# Patient Record
Sex: FEMALE | Race: WHITE | Employment: FULL TIME | URBAN - METROPOLITAN AREA
[De-identification: names, ages, dates, MRNs, and addresses within clinical notes are randomized per-mention and may not be internally consistent; named-entity substitution may affect disease eponyms.]

---

## 2017-08-24 ENCOUNTER — APPOINTMENT (OUTPATIENT)
Dept: PHYSICAL THERAPY | Age: 47
End: 2017-08-24

## 2017-08-31 ENCOUNTER — HOSPITAL ENCOUNTER (OUTPATIENT)
Dept: PHYSICAL THERAPY | Age: 47
End: 2017-08-31

## 2018-02-26 ENCOUNTER — HOSPITAL ENCOUNTER (OUTPATIENT)
Dept: ULTRASOUND IMAGING | Age: 48
Discharge: HOME | End: 2018-02-26
Payer: COMMERCIAL

## 2018-02-26 DIAGNOSIS — R14.0 ABDOMINAL DISTENTION: ICD-10-CM

## 2018-02-26 PROCEDURE — 76830 TRANSVAGINAL US NON-OB: CPT

## 2018-02-26 PROCEDURE — 76700 US EXAM ABDOM COMPLETE: CPT

## 2018-02-26 PROCEDURE — 76856 US EXAM PELVIC COMPLETE: CPT

## 2018-04-02 ENCOUNTER — TELEPHONE (OUTPATIENT)
Dept: SLEEP MEDICINE | Age: 48
End: 2018-04-02

## 2018-04-02 NOTE — TELEPHONE ENCOUNTER
Patient called stating that she has had an increase in abdominal bloating for the past few months. She would like a decrease in her PAP pressure setting.  She can be contacted at [phone]

## 2018-04-03 NOTE — TELEPHONE ENCOUNTER
Patient called today with a pressure change request on her Resmed  Airsense CPAP device. She has been having problems with feeling bloated in the morning after using her CPAP at night and wanted the pressure reduced from 11cm to 9cm to see if this corrects the problem she has been experiencing the past month or so. A download was completed and mask leak is minimal. Download will be scanned into her chart. Pressure was adjusted remotely in the lab from 11cm to 9cm and a two week download will be done to check her progress. She will also call the lab is the problem persists.

## 2018-04-27 ENCOUNTER — TELEPHONE (OUTPATIENT)
Dept: SLEEP MEDICINE | Age: 48
End: 2018-04-27

## 2018-04-27 NOTE — TELEPHONE ENCOUNTER
Patient called stating that she thinks that her pressure settings need to be adjusted. Please advise.

## 2018-04-30 NOTE — TELEPHONE ENCOUNTER
Spoke with patient who was concerned her apnea had increased with pressure change from 4/3/18. Patient was assured AHI is still within normal range. However, we can make further adjustments in pressure if this would be more comfortable for her. Patient agrees to keep pressure set at 9 cm and will contact us if she feels a change is needed.

## 2018-10-18 ENCOUNTER — OFFICE VISIT (OUTPATIENT)
Dept: SLEEP MEDICINE | Age: 48
End: 2018-10-18

## 2018-10-18 VITALS
WEIGHT: 209.9 LBS | DIASTOLIC BLOOD PRESSURE: 73 MMHG | BODY MASS INDEX: 34.97 KG/M2 | SYSTOLIC BLOOD PRESSURE: 116 MMHG | HEIGHT: 65 IN | HEART RATE: 79 BPM | OXYGEN SATURATION: 95 %

## 2018-10-18 DIAGNOSIS — I10 ESSENTIAL HYPERTENSION: ICD-10-CM

## 2018-10-18 DIAGNOSIS — Z78.9 INTOLERANCE OF CONTINUOUS POSITIVE AIRWAY PRESSURE (CPAP) VENTILATION: ICD-10-CM

## 2018-10-18 DIAGNOSIS — Z99.89 OSA ON CPAP: Primary | ICD-10-CM

## 2018-10-18 DIAGNOSIS — G47.33 OSA ON CPAP: Primary | ICD-10-CM

## 2018-10-18 RX ORDER — PANTOPRAZOLE SODIUM 40 MG/1
TABLET, DELAYED RELEASE ORAL
Refills: 5 | COMMUNITY
Start: 2018-10-07

## 2018-10-18 NOTE — PROGRESS NOTES
[address] [place]. [address], [address] Tel.  [phone] Fax. 100 [facility] 60 [place], [address] Tel.  [phone] Fax. [phone] [address] [facility] [place] [address] Tel.  [phone] Fax. [phone] S>Helene Damico is a 46 y.o. female seen for a positive airway pressure follow-up. She reports no problems using the device. She is 99% compliant over the past 90 days. The following problems are identified: 
 
Drowsiness no Problems exhaling no Snoring no Forget to put on no Mask Comfortable yes Can't fall asleep no  
Dry Mouth no Mask falls off no Air Leaking no Frequent awakenings no She admits that her sleep has improved. Allergies Allergen Reactions  Erythromycin Rash She has a current medication list which includes the following prescription(s): pantoprazole, lisinopril, simvastatin, ergocalciferol (vitamin d2), nicotinic acid, and ranitidine. Dominick Kirk She  has a past medical history of Hypertension. [place] Sleepiness Score: 11 
 and Modified F.O.S.Q. Score Total / 2: 15.5 
 which reflect improved sleep quality over therapy time. O>   
Visit Vitals /73 Pulse 79 Ht 5' 5\" (1.651 m) Wt 209 lb 14.4 oz (95.2 kg) SpO2 95% BMI 34.93 kg/m² General:   Not in acute distress Eyes:  Anicteric sclerae, no obvious strabismus Nose:  No obvious nasal septum deviation Oropharynx:   Class 4 oropharyngeal outlet, thick tongue base, uvula not seen due to low-lying soft palate, narrow tonsilo-pharyngeal pilars Tonsils:   tonsils are not visualized due to low-lying soft palate Neck:   midline trachea Chest/Lungs:  Equal lung expansion, clear on auscultation CVS:  Normal rate, regular rhythm; no JVD Skin:  Warm to touch; no obvious rashes Neuro:  No focal deficits ; no obvious tremor Psych:  Normal affect,  normal countenance;    
 
 
 
A> 
  ICD-10-CM ICD-9-CM 1.  DIANA on CPAP G47.33 327.23 SLEEP LAB (PAP TITRATION) Z99.89 V46.8 2. Intolerance of continuous positive airway pressure (CPAP) ventilation Z78.9 V49.89 SLEEP LAB (PAP TITRATION) 3. Essential hypertension I10 401.9 SLEEP LAB (PAP TITRATION) 4. BMI 34.0-34.9,adult Z68.34 V85.34 SLEEP LAB (PAP TITRATION) AHI = 55 (2016). On CPAP :  9 cmH2O. Compliant:      yes Therapeutic Response:  Negative P> * We have recommended a dedicated weight loss through appropriate diet and an exercise regiment as significant weight reduction has been shown to reduce severity of obstructive sleep apnea. * Follow-up Disposition: 
Return if symptoms worsen or fail to improve. * She was asked to contact our office for any problems regarding PAP therapy. * Counseling was provided regarding the importance of regular PAP use and on proper sleep hygiene and safe driving. * Re-enforced proper and regular cleaning for the device. Thank you for allowing us to participate in your patient's medical care. Mor Fraire MD, [place] Electronically signed. 10/18/18

## 2018-10-18 NOTE — PATIENT INSTRUCTIONS
201 [facility] [place]. 1668 [facility], [address] Tel.  [phone] Fax. 100 [facility] 60 [place], [address] [facility] Tel.  [phone] Fax. [phone] 9250 [place] Tel.  [phone] Fax. [phone] Learning About CPAP for Sleep Apnea What is CPAP? CPAP is a small machine that you use at home every night while you sleep. It increases air pressure in your throat to keep your airway open. When you have sleep apnea, this can help you sleep better so you feel much better. CPAP stands for \"continuous positive airway pressure. \" The CPAP machine will have one of the following: · A mask that covers your nose and mouth · Prongs that fit into your nose · A mask that covers your nose only, the most common type. This type is called NCPAP. The N stands for \"nasal.\" Why is it done? CPAP is usually the best treatment for obstructive sleep apnea. It is the first treatment choice and the most widely used. Your doctor may suggest CPAP if you have: · Moderate to severe sleep apnea. · Sleep apnea and coronary artery disease (CAD) or heart failure. How does it help? · CPAP can help you have more normal sleep, so you feel less sleepy and more alert during the daytime. · CPAP may help keep heart failure or other heart problems from getting worse. · NCPAP may help lower your blood pressure. · If you use CPAP, your bed partner may also sleep better because you are not snoring or restless. What are the side effects? Some people who use CPAP have: · A dry or stuffy nose and a sore throat. · Irritated skin on the face. · Sore eyes. · Bloating. If you have any of these problems, work with your doctor to fix them. Here are some things you can try: · Be sure the mask or nasal prongs fit well. · See if your doctor can adjust the pressure of your CPAP. · If your nose is dry, try a humidifier.  
· If your nose is runny or stuffy, try decongestant medicine or a steroid nasal spray. If these things do not help, you might try a different type of machine. Some machines have air pressure that adjusts on its own. Others have air pressures that are different when you breathe in than when you breathe out. This may reduce discomfort caused by too much pressure in your nose. Where can you learn more? Go to [employer] Enter K417 in the search box to learn more about \"Learning About CPAP for Sleep Apnea. \"  
© 7858-5993 Healthwise, [employer]. Care instructions adapted under license by Clara Monge (which disclaims liability or warranty for this information). This care instruction is for use with your licensed healthcare professional. If you have questions about a medical condition or this instruction, always ask your healthcare professional. [address] any warranty or liability for your use of this information. Content Version: 3.0.53518; Last Revised: January 11, 2010 PROPER SLEEP HYGIENE What to avoid · Do not have drinks with caffeine, such as coffee or black tea, for 8 hours before bed. · Do not smoke or use other types of tobacco near bedtime. Nicotine is a stimulant and can keep you awake. · Avoid drinking alcohol late in the evening, because it can cause you to wake in the middle of the night. · Do not eat a big meal close to bedtime. If you are hungry, eat a light snack. · Do not drink a lot of water close to bedtime, because the need to urinate may wake you up during the night. · Do not read or watch TV in bed. Use the bed only for sleeping and sexual activity. What to try · Go to bed at the same time every night, and wake up at the same time every morning. Do not take naps during the day. · Keep your bedroom quiet, dark, and cool. · Get regular exercise, but not within 3 to 4 hours of your bedtime. Dominick Kirk · Sleep on a comfortable pillow and mattress.  
· If watching the clock makes you anxious, turn it facing away from you so you cannot see the time. · If you worry when you lie down, start a worry book. Well before bedtime, write down your worries, and then set the book and your concerns aside. · Try meditation or other relaxation techniques before you go to bed. · If you cannot fall asleep, get up and go to another room until you feel sleepy. Do something relaxing. Repeat your bedtime routine before you go to bed again. · Make your house quiet and calm about an hour before bedtime. Turn down the lights, turn off the TV, log off the computer, and turn down the volume on music. This can help you relax after a busy day. Drowsy Driving: The [employer] cites drowsiness as a causing factor in more than 083,711 police reported crashes annually, resulting in 76,000 injuries and 1,500 deaths. Other surveys suggest 55% of people polled have driven while drowsy in the past year, 23% had fallen asleep but not crashed, 3% crashed, and 2% had and accident due to drowsy driving. Who is at risk? Young Drivers: One study of drowsy driving accidents states that 55% of the drivers were under 25 years. Of those, 75% were male. Shift Workers and Travelers: People who work overnight or travel across time zones frequently are at higher risk of experiencing Circadian Rhythm Disorders. They are trying to work and function when their body is programed to sleep. Sleep Deprived: Lack of sleep has a serious impact on your ability to pay attention or focus on a task. Consistently getting less than the average of 8 hours your body needs creates partial or cumulative sleep deprivation. Untreated Sleep Disorders: Sleep Apnea, Narcolepsy, R.L.S., and other sleep disorders (untreated) prevent a person from getting enough restful sleep. This leads to excessive daytime sleepiness and increases the risk for drowsy driving accidents by up to 7 times.  
Medications / Alcohol: Even over the counter medications can cause drowsiness. Medications that impair a drivers attention should have a warning label. Alcohol naturally makes you sleepy and on its own can cause accidents. Combined with excessive drowsiness its effects are amplified. Signs of Drowsy Driving: * You don't remember driving the last few miles * You may drift out of your toño * You are unable to focus and your thoughts wander * You may yawn more often than normal 
 * You have difficulty keeping your eyes open / nodding off * Missing traffic signs, speeding, or tailgating Prevention-  
Good sleep hygiene, lifestyle and behavioral choices have the most impact on drowsy driving. There is no substitute for sleep and the average person requires 8 hours nightly. If you find yourself driving drowsy, stop and sleep. Consider the sleep hygiene tips provided during your visit as well. Medication Refill Policy: Refills for all medications require 1 week advance notice. Please have your pharmacy fax a refill request. We are unable to fax, or call in \"controled substance\" medications and you will need to pick these prescriptions up from our office. Ubers[employer] Activation Thank you for requesting access to Ubers[employer]. Please follow the instructions below to securely access and download your online medical record. Ubersense allows you to send messages to your doctor, view your test results, renew your prescriptions, schedule appointments, and more. How Do I Sign Up? 1. In your internet browser, go to [email or web address]. 2. Click on the First Time User? Click Here link in the Sign In box. You will see the New Member Sign Up page. 3. Enter your U[employer] Access Code exactly as it appears below. You will not need to use this code after youve completed the sign-up process. If you do not sign up before the expiration date, you must request a new code.  
 
Ubersense Access Code: [email or web address] Expires: 2019 10:52 AM (This is the date your [employer] access code will ) 4. Enter the last four digits of your Social Security Number (xxxx) and Date of Birth (mm/dd/yyyy) as indicated and click Submit. You will be taken to the next sign-up page. 5. Create a [employer] ID. This will be your [employer] login ID and cannot be changed, so think of one that is secure and easy to remember. 6. Create a [employer] password. You can change your password at any time. 7. Enter your Password Reset Question and Answer. This can be used at a later time if you forget your password. 8. Enter your e-mail address. You will receive e-mail notification when new information is available in [address] 9. Click Sign Up. You can now view and download portions of your medical record. 10. Click the Download Summary menu link to download a portable copy of your medical information. Additional Information If you have questions, please call [phone]. Remember, [employer] is NOT to be used for urgent needs. For medical emergencies, dial 911.

## 2018-12-19 ENCOUNTER — HOSPITAL ENCOUNTER (OUTPATIENT)
Dept: SLEEP MEDICINE | Age: 48
Discharge: HOME | End: 2018-12-19
Payer: COMMERCIAL

## 2018-12-19 VITALS
HEIGHT: 65 IN | DIASTOLIC BLOOD PRESSURE: 84 MMHG | SYSTOLIC BLOOD PRESSURE: 149 MMHG | WEIGHT: 215.9 LBS | HEART RATE: 85 BPM | BODY MASS INDEX: 35.97 KG/M2 | OXYGEN SATURATION: 98 %

## 2018-12-19 DIAGNOSIS — G47.33 OSA ON CPAP: ICD-10-CM

## 2018-12-19 DIAGNOSIS — Z78.9 INTOLERANCE OF CONTINUOUS POSITIVE AIRWAY PRESSURE (CPAP) VENTILATION: ICD-10-CM

## 2018-12-19 DIAGNOSIS — I10 ESSENTIAL HYPERTENSION: ICD-10-CM

## 2018-12-19 DIAGNOSIS — Z99.89 OSA ON CPAP: ICD-10-CM

## 2018-12-19 PROCEDURE — 95811 POLYSOM 6/>YRS CPAP 4/> PARM: CPT | Performed by: INTERNAL MEDICINE

## 2018-12-20 ENCOUNTER — TELEPHONE (OUTPATIENT)
Dept: SLEEP MEDICINE | Age: 48
End: 2018-12-20

## 2018-12-20 DIAGNOSIS — G47.33 OSA (OBSTRUCTIVE SLEEP APNEA): Primary | ICD-10-CM

## 2019-01-04 ENCOUNTER — DOCUMENTATION ONLY (OUTPATIENT)
Dept: SLEEP MEDICINE | Age: 49
End: 2019-01-04

## 2019-01-10 ENCOUNTER — DOCUMENTATION ONLY (OUTPATIENT)
Dept: SLEEP MEDICINE | Age: 49
End: 2019-01-10

## 2019-01-10 NOTE — TELEPHONE ENCOUNTER
Orders Placed This Encounter    AMB SUPPLY ORDER     Diagnosis: Sleep Apnea ICD-10 Code (G47.30); ICD-9 Code (780.57). Positive Airway Pressure Therapy: Duration of need: 99 months. ResMed VPAP Auto (VAuto Mode): Maximum IPAP: 15 cmH2O; Minimum EPAP: 09 cmH2O; PS: 4 cmH2O. Ramp Time: 30 Minutes. CPAP mask -  As fitted during titration OR patient preference, headgear, tubing, and filter;  heated humidifier; wireless modem. Remote monitoring enrollment. Forest Dodge MD, FAASM; NPI: [id number]  Electronically signed. 01/10/19

## 2019-01-11 ENCOUNTER — TELEPHONE (OUTPATIENT)
Dept: SLEEP MEDICINE | Age: 49
End: 2019-01-11

## 2019-01-14 ENCOUNTER — DOCUMENTATION ONLY (OUTPATIENT)
Dept: SLEEP MEDICINE | Age: 49
End: 2019-01-14

## 2019-01-14 NOTE — TELEPHONE ENCOUNTER
Patient returned call and requested to change DME providers. She also wanted to confirm that she is getting a BiPAP device. DME [facility]. Sleep records and orders faxed. Also confirmed that order is for BiPAP device. DME process explained to patient. PAP adherence appointment was discussed with patient. Patient's adherence visit is scheduled for 4/2/18 with Dr. Tone Milian.

## 2019-02-08 ENCOUNTER — OFFICE VISIT (OUTPATIENT)
Dept: CARDIOLOGY CLINIC | Age: 49
End: 2019-02-08

## 2019-02-08 VITALS
DIASTOLIC BLOOD PRESSURE: 88 MMHG | WEIGHT: 218.4 LBS | OXYGEN SATURATION: 96 % | SYSTOLIC BLOOD PRESSURE: 130 MMHG | HEART RATE: 74 BPM | BODY MASS INDEX: 36.39 KG/M2 | HEIGHT: 65 IN | RESPIRATION RATE: 17 BRPM

## 2019-02-08 DIAGNOSIS — R06.09 DOE (DYSPNEA ON EXERTION): ICD-10-CM

## 2019-02-08 DIAGNOSIS — R07.9 CHEST PAIN, UNSPECIFIED TYPE: Primary | ICD-10-CM

## 2019-02-08 DIAGNOSIS — E66.01 SEVERE OBESITY (HCC): ICD-10-CM

## 2019-02-08 DIAGNOSIS — R53.83 FATIGUE, UNSPECIFIED TYPE: ICD-10-CM

## 2019-02-08 RX ORDER — LANOLIN ALCOHOL/MO/W.PET/CERES
1000 CREAM (GRAM) TOPICAL DAILY
COMMUNITY

## 2019-02-08 RX ORDER — ASPIRIN 81 MG/1
TABLET ORAL DAILY
COMMUNITY

## 2019-02-08 NOTE — PROGRESS NOTES
[place]: Shaaron Schaumann 
[phone] Requesting/referring provider: Dr. Simón Palmer Reason for Consult: Chest pain and CARREON 
 
HPI: Brian Burkett, a 46y.o. year-old who presents for evaluation of chest pain and shortness of breath starting 2 weeks ago while working out with her . Substernal then went away with rest. Then came back again. Lasted longer. Like a squeeze, thinks it could have been stress but not sure, has a bad family history and that is on her mind. Feels like she is bloated in the belly, has gained a bit of weight No chest pain today. No edema. No no PND or orthopnea. Rare palps, no syncope. Assessment/Plan: 1. Body mass index is 36.34 kg/m². 2. Chest pain- stress test to evalute 3. CARREON worse the faster she goes. 4. HTN controlled on lisinopril 5. Dyslipidemia cont simvastatin 6. DIANA on CPAP nightly, just changed to BIPAP 7. GERD on PPI Fhx dad with Mi at age 61, lots of trouble with it cabg, x4 CEA, stent CHF, mother with high cholesterol, brother with Mi at age 61 gm with pacer She  has a past medical history of Hypertension. Cardiovascular ROS: positive for - chest pain and dyspnea on exertion Respiratory ROS: positive for - shortness of breath Neurological ROS: no TIA or stroke symptoms All other systems negative except as above. PE 
Vitals:  
 02/08/19 9988 BP: 130/88 Pulse: 74 Resp: 17 SpO2: 96% Weight: 218 lb 6.4 oz (99.1 kg) Height: 5' 5\" (1.651 m) Body mass index is 36.34 kg/m². General appearance - alert, well appearing, and in no distress Mental status - affect appropriate to mood Eyes - sclera anicteric, moist mucous membranes Neck - supple, no significant adenopathy Lymphatics - no  lymphadenopathy Chest - clear to auscultation, no wheezes, rales or rhonchi Heart - normal rate, regular rhythm, normal S1, S2, no murmurs, rubs, clicks or gallops Abdomen - soft, nontender, nondistended, no masses or organomegaly Back exam - full range of motion, no tenderness Neurological - cranial nerves II through XII grossly intact, no focal deficit Musculoskeletal - no muscular tenderness noted, normal strength Extremities - peripheral pulses normal, no pedal edema Skin - normal coloration  no rashes Recent Labs: 
No results found for: CHOL, CHOLX, CHLST, CHOLV, 124502, HDL, LDL, LDLC, DLDLP, TGLX, TRIGL, TRIGP, CHHD, CHHDX No results found for: [place], 300 [facility], 530 [facility], [address] [address], 615 [facility], 100 [facility]3] No results found for: BUN, BUNPOC, 49 [facility], 202 [facility], BUNV No results found for: K, KI, PLK, WBK No results found for: HBA1C, HGBE8, QYV1PBMP No results found for: HGBPOC, HGB, HGBP, HGBEXT No results found for: PLT, PLTEXT Reviewed: 
Past Medical History:  
Diagnosis Date  Hypertension Social History Tobacco Use Smoking Status Never Smoker Smokeless Tobacco Never Used Social History Substance and Sexual Activity Alcohol Use No  
 Alcohol/week: 0.0 oz Allergies Allergen Reactions  Erythromycin Rash Current Outpatient Medications Medication Sig  cyanocobalamin (VITAMIN B-12) 1,000 mcg tablet Take 1,000 mcg by mouth daily.  aspirin delayed-release 81 mg tablet Take  by mouth daily.  pantoprazole (PROTONIX) 40 mg tablet TAKE 1 TABLET BY MOUTH EVERY DAY  lisinopril (PRINIVIL, ZESTRIL) 5 mg tablet Take  by mouth daily.  simvastatin (ZOCOR) 40 mg tablet Take  by mouth nightly.  ERGOCALCIFEROL, VITAMIN D2, by Does Not Apply route.  nicotinic acid (NIACIN) 500 mg tablet Take 500 mg by mouth Daily (before breakfast).  ranitidine (ZANTAC) 150 mg tablet Take 150 mg by mouth two (2) times a day. No current facility-administered medications for this visit. Alex Sharif MD 
[facility] heart and Vascular [address], [address]

## 2019-02-18 ENCOUNTER — HOSPITAL ENCOUNTER (OUTPATIENT)
Dept: CT IMAGING | Age: 49
Discharge: HOME | End: 2019-02-18
Payer: SELF-PAY

## 2019-02-18 DIAGNOSIS — Z00.00 PREVENTATIVE HEALTH CARE: ICD-10-CM

## 2019-02-18 PROCEDURE — 75571 CT HRT W/O DYE W/CA TEST: CPT

## 2019-02-22 ENCOUNTER — TELEPHONE (OUTPATIENT)
Dept: CARDIOLOGY CLINIC | Age: 49
End: 2019-02-22

## 2019-02-22 NOTE — TELEPHONE ENCOUNTER
Returned patient's call, 2 pt identifiers used  The following message given per Dr. Marv Jo:    MD Jair Loyd, RN   Cc: Fede Allan RN             Low risk, indeterminate with ca core of 1.  Cont healthy lifestyle dara

## 2019-03-08 ENCOUNTER — TELEPHONE (OUTPATIENT)
Dept: CARDIOLOGY CLINIC | Age: 49
End: 2019-03-08

## 2019-03-08 NOTE — TELEPHONE ENCOUNTER
Pt called stating she has received her stress test results and she is not sure what it means. She is also asking if she should continue the aspirin and if she needs to have a f/u appointment scheduled. She is also wondering if her records have been sent from Dr. Sharla Lujan.   Phone #[phone]  Thanks

## 2019-03-08 NOTE — TELEPHONE ENCOUNTER
Returned patient's call, 2 pt identifiers used    Interpretation Summary     · Baseline ECG: Normal.  · Negative stress test.  · Stress test results correlate with a low risk of inducible myocardial ischemia. · Normal stress echocardiogram. Low risk study. Above stress echo results given. Per Dr. Paul Rodriguez patient does not need to take ASA with a ca score of 1. No f/u appt needed at this time.     Future Appointments   Date Time Provider Vanessa Villai   4/2/2019  9:20 AM Sukumar May MD [facility] [address]

## 2019-04-02 ENCOUNTER — OFFICE VISIT (OUTPATIENT)
Dept: SLEEP MEDICINE | Age: 49
End: 2019-04-02

## 2019-04-02 ENCOUNTER — DOCUMENTATION ONLY (OUTPATIENT)
Dept: SLEEP MEDICINE | Age: 49
End: 2019-04-02

## 2019-04-02 VITALS
BODY MASS INDEX: 36.99 KG/M2 | WEIGHT: 222 LBS | DIASTOLIC BLOOD PRESSURE: 70 MMHG | HEIGHT: 65 IN | HEART RATE: 82 BPM | SYSTOLIC BLOOD PRESSURE: 106 MMHG | OXYGEN SATURATION: 96 %

## 2019-04-02 DIAGNOSIS — G47.33 OSA (OBSTRUCTIVE SLEEP APNEA): Primary | ICD-10-CM

## 2019-04-02 DIAGNOSIS — I10 ESSENTIAL HYPERTENSION: ICD-10-CM

## 2019-04-02 NOTE — Clinical Note
* Device pressure change to Bi - Level  14/10 cmH2O. * Mask evaluation done in the office - see tech notes. * PAP card download in 4 weeks.

## 2019-04-02 NOTE — PROGRESS NOTES
[address] [place]. [place], [address]  Tel.  [phone]  Fax. 100 [facility] 60  [place], [address] [place]  Tel.  [phone]  Fax. [phone] 9[address] [facility] Gertrude Simeon   Tel.  [phone]  Fax. [phone]     S>Helene Reeder is a 46 y.o. female seen for a positive airway pressure follow-up. She reports no problems using the device. She is ?% compliant over the past 98 days. The following problems are identified:    Drowsiness no Problems exhaling no   Snoring no Forget to put on no   Mask Comfortable yes Can't fall asleep no   Dry Mouth no Mask falls off no   Air Leaking no Frequent awakenings no         She admits that her sleep has improved on PAP therapy using nasal pillows mask and heated tubing. She reports of much improved sleep but persistent bloating on PAP Therapy. Allergies   Allergen Reactions    Erythromycin Rash       She has a current medication list which includes the following prescription(s): cyanocobalamin, aspirin delayed-release, pantoprazole, lisinopril, simvastatin, ergocalciferol (vitamin d2), nicotinic acid, and ranitidine. .      She  has a past medical history of Hypertension. [place] Sleepiness Score: 4   and Modified F.O.S.Q. Score Total / 2: 18.5   which reflect improved sleep quality over therapy time.     O>    Visit Vitals  /70   Pulse 82   Ht 5' 5\" (1.651 m)   Wt 222 lb (100.7 kg)   SpO2 96%   BMI 36.94 kg/m²         General:   Not in acute distress   Eyes:  Anicteric sclerae, no obvious strabismus   Nose:  No obvious nasal septum deviation    Oropharynx:   Class 4 oropharyngeal outlet, thick tongue base, uvula not seen due to low-lying soft palate, narrow tonsilo-pharyngeal pilars   Tonsils:   tonsils are not visualized due to low-lying soft palate   Neck:   midline trachea   Chest/Lungs:  Equal lung expansion, clear on auscultation    CVS:  Normal rate, regular rhythm; no JVD   Skin:  Warm to touch; no obvious rashes   Neuro:  No focal deficits ; no obvious tremor    Psych:  Normal affect,  normal countenance;           A>    ICD-10-CM ICD-9-CM    1. DIANA (obstructive sleep apnea) G47.33 327.23 AMB SUPPLY ORDER   2. BMI 36.0-36.9,adult Z68.36 V85.36    3. Essential hypertension I10 401.9      AHI = 55 (2016). On Bi - Level :  See media for download. Compliant:      yes    Therapeutic Response:  Positive    P>    * Device pressure change to Bi - Level  14/10 cmH2O. * Mask evaluation done in the office - see tech notes. * PAP card download in 4 weeks. * We have recommended a dedicated weight loss through appropriate diet and an exercise regiment as significant weight reduction has been shown to reduce severity of obstructive sleep apnea. *   Follow-up and Dispositions    · Return in about 1 year (around 4/2/2020), or if symptoms worsen or fail to improve. * She was asked to contact our office for any problems regarding PAP therapy. * Counseling was provided regarding the importance of regular PAP use and on proper sleep hygiene and safe driving. * Re-enforced proper and regular cleaning for the device. Thank you for allowing us to participate in your patient's medical care. Nance Duverney, MD, FAASM  Electronically signed.  04/02/19

## 2019-04-02 NOTE — PATIENT INSTRUCTIONS
[address] [place]. [place], [address]  Tel.  [phone]  Fax. [address] [facility] 60  [place], [address] [facility]  Tel.  [phone]  Fax. [phone] [address]  Tel.  [phone]  Fax. [phone]     Learning About CPAP for Sleep Apnea  What is CPAP? CPAP is a small machine that you use at home every night while you sleep. It increases air pressure in your throat to keep your airway open. When you have sleep apnea, this can help you sleep better so you feel much better. CPAP stands for \"continuous positive airway pressure. \"  The CPAP machine will have one of the following:  · A mask that covers your nose and mouth  · Prongs that fit into your nose  · A mask that covers your nose only, the most common type. This type is called NCPAP. The N stands for \"nasal.\"  Why is it done? CPAP is usually the best treatment for obstructive sleep apnea. It is the first treatment choice and the most widely used. Your doctor may suggest CPAP if you have:  · Moderate to severe sleep apnea. · Sleep apnea and coronary artery disease (CAD) or heart failure. How does it help? · CPAP can help you have more normal sleep, so you feel less sleepy and more alert during the daytime. · CPAP may help keep heart failure or other heart problems from getting worse. · NCPAP may help lower your blood pressure. · If you use CPAP, your bed partner may also sleep better because you are not snoring or restless. What are the side effects? Some people who use CPAP have:  · A dry or stuffy nose and a sore throat. · Irritated skin on the face. · Sore eyes. · Bloating. If you have any of these problems, work with your doctor to fix them. Here are some things you can try:  · Be sure the mask or nasal prongs fit well. · See if your doctor can adjust the pressure of your CPAP. · If your nose is dry, try a humidifier.   · If your nose is runny or stuffy, try decongestant medicine or a steroid nasal spray. If these things do not help, you might try a different type of machine. Some machines have air pressure that adjusts on its own. Others have air pressures that are different when you breathe in than when you breathe out. This may reduce discomfort caused by too much pressure in your nose. Where can you learn more? Go to [employer]  Enter [employer] in the search box to learn more about \"Learning About CPAP for Sleep Apnea. \"   © 2770-7089 Healthwise, Incorporated. Care instructions adapted under license by [facility] [employer] (which disclaims liability or warranty for this information). This care instruction is for use with your licensed healthcare professional. If you have questions about a medical condition or this instruction, always ask your healthcare professional. [address] any warranty or liability for your use of this information. Content Version: 2.7.36317; Last Revised: January 11, 2010  PROPER SLEEP HYGIENE    What to avoid  · Do not have drinks with caffeine, such as coffee or black tea, for 8 hours before bed. · Do not smoke or use other types of tobacco near bedtime. Nicotine is a stimulant and can keep you awake. · Avoid drinking alcohol late in the evening, because it can cause you to wake in the middle of the night. · Do not eat a big meal close to bedtime. If you are hungry, eat a light snack. · Do not drink a lot of water close to bedtime, because the need to urinate may wake you up during the night. · Do not read or watch TV in bed. Use the bed only for sleeping and sexual activity. What to try  · Go to bed at the same time every night, and wake up at the same time every morning. Do not take naps during the day. · Keep your bedroom quiet, dark, and cool. · Get regular exercise, but not within 3 to 4 hours of your bedtime. .  · Sleep on a comfortable pillow and mattress.   · If watching the clock makes you anxious, turn it facing away from you so you cannot see the time. · If you worry when you lie down, start a worry book. Well before bedtime, write down your worries, and then set the book and your concerns aside. · Try meditation or other relaxation techniques before you go to bed. · If you cannot fall asleep, get up and go to another room until you feel sleepy. Do something relaxing. Repeat your bedtime routine before you go to bed again. · Make your house quiet and calm about an hour before bedtime. Turn down the lights, turn off the TV, log off the computer, and turn down the volume on music. This can help you relax after a busy day. Drowsy Driving: The [facility] cites drowsiness as a causing factor in more than 249,086 police reported crashes annually, resulting in 76,000 injuries and 1,500 deaths. Other surveys suggest 55% of people polled have driven while drowsy in the past year, 23% had fallen asleep but not crashed, 3% crashed, and 2% had and accident due to drowsy driving. Who is at risk? Young Drivers: One study of drowsy driving accidents states that 55% of the drivers were under 25 years. Of those, 75% were male. Shift Workers and Travelers: People who work overnight or travel across time zones frequently are at higher risk of experiencing Circadian Rhythm Disorders. They are trying to work and function when their body is programed to sleep. Sleep Deprived: Lack of sleep has a serious impact on your ability to pay attention or focus on a task. Consistently getting less than the average of 8 hours your body needs creates partial or cumulative sleep deprivation. Untreated Sleep Disorders: Sleep Apnea, Narcolepsy, R.L.S., and other sleep disorders (untreated) prevent a person from getting enough restful sleep. This leads to excessive daytime sleepiness and increases the risk for drowsy driving accidents by up to 7 times.   Medications / Alcohol: Even over the counter medications can cause drowsiness. Medications that impair a drivers attention should have a warning label. Alcohol naturally makes you sleepy and on its own can cause accidents. Combined with excessive drowsiness its effects are amplified. Signs of Drowsy Driving:   * You don't remember driving the last few miles   * You may drift out of your toño   * You are unable to focus and your thoughts wander   * You may yawn more often than normal   * You have difficulty keeping your eyes open / nodding off   * Missing traffic signs, speeding, or tailgating  Prevention-   Good sleep hygiene, lifestyle and behavioral choices have the most impact on drowsy driving. There is no substitute for sleep and the average person requires 8 hours nightly. If you find yourself driving drowsy, stop and sleep. Consider the sleep hygiene tips provided during your visit as well. Medication Refill Policy: Refills for all medications require 1 week advance notice. Please have your pharmacy fax a refill request. We are unable to fax, or call in \"controled substance\" medications and you will need to pick these prescriptions up from our office. [employer]ter Activation    Thank you for requesting access to [employer]ter. Please follow the instructions below to securely access and download your online medical record. [employer] Plotter allows you to send messages to your doctor, view your test results, renew your prescriptions, schedule appointments, and more. How Do I Sign Up? 1. In your internet browser, go to [email or web address]. 2. Click on the First Time User? Click Here link in the Sign In box. You will see the New Member Sign Up page. 3. Enter your [employer]ter Access Code exactly as it appears below. You will not need to use this code after youve completed the sign-up process. If you do not sign up before the expiration date, you must request a new code. [employer]ter Access Code:  Activation code not generated  Current frooly Status: Active (This is the date your frooly access code will )    4. Enter the last four digits of your Social Security Number (xxxx) and Date of Birth (mm/dd/yyyy) as indicated and click Submit. You will be taken to the next sign-up page. 5. Create a [email or web address] ID. This will be your frooly login ID and cannot be changed, so think of one that is secure and easy to remember. 6. Create a frooly password. You can change your password at any time. 7. Enter your Password Reset Question and Answer. This can be used at a later time if you forget your password. 8. Enter your e-mail address. You will receive e-mail notification when new information is available in [address] 9. Click Sign Up. You can now view and download portions of your medical record. 10. Click the Download Summary menu link to download a portable copy of your medical information. Additional Information    If you have questions, please call [phone]. Remember, frooly is NOT to be used for urgent needs. For medical emergencies, dial 911.

## 2019-06-19 ENCOUNTER — ANESTHESIA EVENT (OUTPATIENT)
Dept: ENDOSCOPY | Age: 49
End: 2019-06-19
Payer: COMMERCIAL

## 2019-06-19 NOTE — ANESTHESIA PREPROCEDURE EVALUATION
Relevant Problems   No relevant active problems       Anesthetic History   No history of anesthetic complications            Review of Systems / Medical History  Patient summary reviewed, nursing notes reviewed and pertinent labs reviewed    Pulmonary        Sleep apnea: BiPAP           Neuro/Psych   Within defined limits           Cardiovascular    Hypertension          Hyperlipidemia         GI/Hepatic/Renal     GERD           Endo/Other        Morbid obesity and arthritis     Other Findings            Physical Exam    Airway  Mallampati: III  TM Distance: > 6 cm  Neck ROM: normal range of motion   Mouth opening: Normal     Cardiovascular  Regular rate and rhythm,  S1 and S2 normal,  no murmur, click, rub, or gallop             Dental  No notable dental hx       Pulmonary  Breath sounds clear to auscultation               Abdominal  GI exam deferred       Other Findings            Anesthetic Plan    ASA: 3  Anesthesia type: MAC            Anesthetic plan and risks discussed with: Patient

## 2019-06-20 ENCOUNTER — ANESTHESIA (OUTPATIENT)
Dept: ENDOSCOPY | Age: 49
End: 2019-06-20
Payer: COMMERCIAL

## 2019-06-20 ENCOUNTER — HOSPITAL ENCOUNTER (OUTPATIENT)
Age: 49
Setting detail: OUTPATIENT SURGERY
Discharge: HOME | End: 2019-06-20
Attending: SPECIALIST | Admitting: SPECIALIST
Payer: COMMERCIAL

## 2019-06-20 VITALS
TEMPERATURE: 97.6 F | HEART RATE: 75 BPM | OXYGEN SATURATION: 100 % | DIASTOLIC BLOOD PRESSURE: 79 MMHG | SYSTOLIC BLOOD PRESSURE: 125 MMHG | RESPIRATION RATE: 10 BRPM

## 2019-06-20 PROCEDURE — 74011250636 HC RX REV CODE- 250/636

## 2019-06-20 PROCEDURE — 76060000032 HC ANESTHESIA 0.5 TO 1 HR: Performed by: SPECIALIST

## 2019-06-20 PROCEDURE — 77030021593 HC FCPS BIOP ENDOSC BSC -A: Performed by: SPECIALIST

## 2019-06-20 PROCEDURE — 76040000007: Performed by: SPECIALIST

## 2019-06-20 PROCEDURE — 77030027957 HC TBNG IRR ENDOGTR BUSS -B: Performed by: SPECIALIST

## 2019-06-20 PROCEDURE — 88305 TISSUE EXAM BY PATHOLOGIST: CPT

## 2019-06-20 RX ORDER — DEXTROMETHORPHAN/PSEUDOEPHED 2.5-7.5/.8
1.2 DROPS ORAL
Status: DISCONTINUED | OUTPATIENT
Start: 2019-06-20 | End: 2019-06-20 | Stop reason: HOSPADM

## 2019-06-20 RX ORDER — SODIUM CHLORIDE 9 MG/ML
INJECTION, SOLUTION INTRAVENOUS
Status: DISCONTINUED | OUTPATIENT
Start: 2019-06-20 | End: 2019-06-20 | Stop reason: HOSPADM

## 2019-06-20 RX ORDER — SODIUM CHLORIDE 9 MG/ML
50 INJECTION, SOLUTION INTRAVENOUS CONTINUOUS
Status: DISCONTINUED | OUTPATIENT
Start: 2019-06-20 | End: 2019-06-20 | Stop reason: HOSPADM

## 2019-06-20 RX ORDER — ATROPINE SULFATE 0.1 MG/ML
0.5 INJECTION INTRAVENOUS
Status: DISCONTINUED | OUTPATIENT
Start: 2019-06-20 | End: 2019-06-20 | Stop reason: HOSPADM

## 2019-06-20 RX ORDER — SODIUM CHLORIDE 0.9 % (FLUSH) 0.9 %
5-40 SYRINGE (ML) INJECTION AS NEEDED
Status: DISCONTINUED | OUTPATIENT
Start: 2019-06-20 | End: 2019-06-20 | Stop reason: HOSPADM

## 2019-06-20 RX ORDER — PROPOFOL 10 MG/ML
INJECTION, EMULSION INTRAVENOUS AS NEEDED
Status: DISCONTINUED | OUTPATIENT
Start: 2019-06-20 | End: 2019-06-20 | Stop reason: HOSPADM

## 2019-06-20 RX ORDER — SODIUM CHLORIDE 0.9 % (FLUSH) 0.9 %
5-40 SYRINGE (ML) INJECTION EVERY 8 HOURS
Status: DISCONTINUED | OUTPATIENT
Start: 2019-06-20 | End: 2019-06-20 | Stop reason: HOSPADM

## 2019-06-20 RX ORDER — FLUMAZENIL 0.1 MG/ML
0.2 INJECTION INTRAVENOUS
Status: DISCONTINUED | OUTPATIENT
Start: 2019-06-20 | End: 2019-06-20 | Stop reason: HOSPADM

## 2019-06-20 RX ORDER — NALOXONE HYDROCHLORIDE 0.4 MG/ML
0.4 INJECTION, SOLUTION INTRAMUSCULAR; INTRAVENOUS; SUBCUTANEOUS
Status: DISCONTINUED | OUTPATIENT
Start: 2019-06-20 | End: 2019-06-20 | Stop reason: HOSPADM

## 2019-06-20 RX ORDER — EPINEPHRINE 0.1 MG/ML
1 INJECTION INTRACARDIAC; INTRAVENOUS
Status: DISCONTINUED | OUTPATIENT
Start: 2019-06-20 | End: 2019-06-20 | Stop reason: HOSPADM

## 2019-06-20 RX ADMIN — PROPOFOL 100 MG: 10 INJECTION, EMULSION INTRAVENOUS at 13:01

## 2019-06-20 RX ADMIN — PROPOFOL 50 MG: 10 INJECTION, EMULSION INTRAVENOUS at 13:11

## 2019-06-20 RX ADMIN — PROPOFOL 50 MG: 10 INJECTION, EMULSION INTRAVENOUS at 12:59

## 2019-06-20 RX ADMIN — PROPOFOL 50 MG: 10 INJECTION, EMULSION INTRAVENOUS at 13:17

## 2019-06-20 RX ADMIN — PROPOFOL 100 MG: 10 INJECTION, EMULSION INTRAVENOUS at 12:57

## 2019-06-20 RX ADMIN — SODIUM CHLORIDE: 9 INJECTION, SOLUTION INTRAVENOUS at 12:56

## 2019-06-20 RX ADMIN — PROPOFOL 50 MG: 10 INJECTION, EMULSION INTRAVENOUS at 13:06

## 2019-06-20 NOTE — DISCHARGE INSTRUCTIONS
[address] [facility].  [address] [address] [facility]   [id number]  1970    DISCOMFORT:  Redness at IV site- apply warm compress to area; if redness or soreness persist- contact your physician  There may be a slight amount of blood passed from the rectum  Gaseous discomfort- walking, belching will help relieve any discomfort  You may not operate a vehicle for 12 hours  You may not  engage in an occupation involving machinery or appliances for rest of today  You may not  drink alcoholic beverages for at least 12 hours  Avoid making any critical decisions for at least 24 hour    DIET:   High fiber diet. - however -  remember your colon is empty and a heavy meal will produce gas. Avoid these foods:  vegetables, fried / greasy foods, carbonated drinks for today      ACTIVITY  You may resume your normal daily activities   Spend the remainder of the day resting -  avoid any strenuous activity. CALL M.D. ANY SIGN OF   Increasing pain, nausea, vomiting  Abdominal distension (swelling)  New increased bleeding (oral or rectal)  Fever (chills)  Pain in chest area  Bloody discharge from nose or mouth  Shortness of breath        ENDOSCOPY FINDINGS:   Your endoscopy showed gastritis and duodenitis and a small hiatal hernia and diverticulosis. You may not  take any Advil, Aspirin, Ibuprofen, Motrin, Aleve, or Goodys for 10 days, ONLY  Tylenol as needed for pain. Post procedure diagnosis:  Hiatal Hernia  Gastritis      Follow-up Instructions:   Call   for any questions or problems. If we took a biopsy please call the office within 2 weeks to discuss your    athology results.  Telephone # [phone]

## 2019-06-20 NOTE — PERIOP NOTES
At time of discharge, patient rated pain to abdomen 3/10. Abdomen soft to palpation. Dr. Santo De Paz assessed patient at time of discharge.

## 2019-06-20 NOTE — ANESTHESIA POSTPROCEDURE EVALUATION
Post-Anesthesia Evaluation and Assessment    Patient: Santana Schwab MRN: [id number]  SSN: [id number]    YOB: 1970  Age: 44 y.o. Sex: female      I have evaluated the patient and they are stable and ready for discharge from the PACU. Cardiovascular Function/Vital Signs  Visit Vitals  /79   Pulse 75   Temp 36.4 °C (97.6 °F)   Resp 10   SpO2 100%       Patient is status post MAC anesthesia for Procedure(s):  EGD  COLONOSCOPY  ESOPHAGOGASTRODUODENAL (EGD) BIOPSY. Nausea/Vomiting: None    Postoperative hydration reviewed and adequate. Pain:  Pain Scale 1: Numeric (0 - 10) (06/20/19 1414)  Pain Intensity 1: 3 (06/20/19 1414)   Managed    Neurological Status: At baseline    Mental Status, Level of Consciousness: Alert and  oriented to person, place, and time    Pulmonary Status:   O2 Device: Room air (06/20/19 1414)   Adequate oxygenation and airway patent    Complications related to anesthesia: None    Post-anesthesia assessment completed. No concerns    Signed By: Miesha Escobedo MD     June 20, 2019              Procedure(s):  EGD  COLONOSCOPY  ESOPHAGOGASTRODUODENAL (EGD) BIOPSY. MAC    <BSHSIANPOST>    Vitals Value Taken Time   BP 0/0 6/20/2019  2:25 PM   Temp 36.4 °C (97.6 °F) 6/20/2019  1:46 PM   Pulse 0 6/20/2019  2:25 PM   Resp 0 6/20/2019  2:45 PM   SpO2 0 % 6/20/2019  2:22 PM   Vitals shown include unvalidated device data.

## 2019-06-20 NOTE — H&P
Colonoscopy History and Physical      The patient was seen and examined. Date of last colonoscopy: none, Polyps  No      The airway was assessed and documented. The problem list, past medical history, and medications were reviewed.      Patient Active Problem List   Diagnosis Code    [facility] ([facility] E66.01     Social History     Socioeconomic History    Marital status:      Spouse name: Not on file    Number of children: Not on file    Years of education: Not on file    Highest education level: Not on file   Occupational History    Not on file   Social Needs    Financial resource strain: Not on file    Food insecurity:     Worry: Not on file     Inability: Not on file    Transportation needs:     Medical: Not on file     Non-medical: Not on file   Tobacco Use    Smoking status: Never Smoker    Smokeless tobacco: Never Used   Substance and Sexual Activity    Alcohol use: No     Alcohol/week: 0.0 oz    Drug use: Not on file    Sexual activity: Not on file   Lifestyle    Physical activity:     Days per week: Not on file     Minutes per session: Not on file    Stress: Not on file   Relationships    Social connections:     Talks on phone: Not on file     Gets together: Not on file     Attends [employer] service: Not on file     Active member of club or organization: Not on file     Attends meetings of clubs or organizations: Not on file     Relationship status: Not on file    Intimate partner violence:     Fear of current or ex partner: Not on file     Emotionally abused: Not on file     Physically abused: Not on file     Forced sexual activity: Not on file   Other Topics Concern     Service Not Asked    Blood Transfusions Not Asked    Caffeine Concern Not Asked    Occupational Exposure Not Asked   [place] Hazards Not Asked    Sleep Concern Not Asked    Stress Concern Not Asked    Weight Concern Not Asked    Special Diet Not Asked    Back Care Not Asked    Exercise Not Asked  Bike Helmet Not Asked   [address] [facility] Not Asked    Self-Exams Not Asked   Social History Narrative    Not on file     Past Medical History:   Diagnosis Date    Arthritis     GERD (gastroesophageal reflux disease)     Hypertension     Ill-defined condition     increased cholesterol    Ill-defined condition     obesity per pt    Sleep apnea     uses BIPAP         Prior to Admission Medications   Prescriptions Last Dose Informant Patient Reported? Taking? ERGOCALCIFEROL, VITAMIN D2,   Yes No   Sig: by Does Not Apply route. aspirin delayed-release 81 mg tablet Unknown at Unknown time  Yes No   Sig: Take  by mouth daily. cyanocobalamin (VITAMIN B-12) 1,000 mcg tablet   Yes No   Sig: Take 1,000 mcg by mouth daily. lisinopril (PRINIVIL, ZESTRIL) 5 mg tablet   Yes No   Sig: Take  by mouth daily. nicotinic acid (NIACIN) 500 mg tablet   Yes No   Sig: Take 500 mg by mouth Daily (before breakfast). pantoprazole (PROTONIX) 40 mg tablet   Yes No   Sig: TAKE 1 TABLET BY MOUTH EVERY DAY   ranitidine (ZANTAC) 150 mg tablet   Yes No   Sig: Take 150 mg by mouth two (2) times a day. simvastatin (ZOCOR) 40 mg tablet   Yes No   Sig: Take  by mouth nightly. Facility-Administered Medications: None       The patient was seen and examined in the endoscopy suite. The airway was assessed and docuemented. The problem list and medications were reviewed. Chief complaint, history of present illness, and review of systems and Past medical History are positive for: GERD, obesity, bloating and colon cancer screening. The heart, lungs and mental status were satisfactory for the administration of sedation and for the procedure. I discussed with the patient the objectives, risks, consequences and alternatives to the procedure.      Plan: Endoscopic procedure with sedation     Jerel Dennis MD   6/20/2019  12:54 PM

## 2019-06-20 NOTE — ROUTINE PROCESS
Lenard Siu 1970 
[id number] Situation: 
Verbal report received from: [place] Procedure: Procedure(s): EGD 
COLONOSCOPY 
ESOPHAGOGASTRODUODENAL (EGD) BIOPSY Background: 
 
Preoperative diagnosis: GASTRO-ESOPHAGEL REFLUX DISEASE SCREENING FOR MALIGNANT NEOPLASMS OF COLON Postoperative diagnosis: Hiatal Hernia Gastritis :  Dr. Lilliana Guidry Assistant(s): Endoscopy Technician-1: Patience Aguilera Endoscopy RN-1: Anila Flood RN Specimens:  
ID Type Source Tests Collected by Time Destination 1 : Antrum bx Preservative   Vonda Simon MD 6/20/2019 1301 Pathology 2 : Duodenum bx Preservative   Vonda Simon MD 6/20/2019 1303 Pathology 3 : EG junction bx Preservative   Vonda Simon MD 6/20/2019 1305 Pathology H. Pylori  no Assessment: 
Intra-procedure medications Anesthesia gave intra-procedure sedation and medications, see anesthesia flow sheet yes Intravenous fluids: NS@ Horris Harms Vital signs stable Abdominal assessment: round and soft Recommendation: 
Discharge patient per MD order. Family or Friend Permission to share finding with family or friend yes

## 2019-06-20 NOTE — PROCEDURES
[address]  [address] [facility], [address]                 Colonoscopy Procedure Note    Indications:   See Preoperative Diagnosis above  Referring Physician: Victor Hugo Samuels MD  Anesthesia/Sedation: MAC anesthesia Propofol  Endoscopist:  Dr. Sridhar King  Assistant:  Endoscopy Technician-1: Trung Jackson  Endoscopy RN-1: Mane Alford RN  Preoperative diagnosis: GASTRO-ESOPHAGEL REFLUX DISEASE  SCREENING FOR MALIGNANT NEOPLASMS OF COLON  Postoperative diagnosis: Hiatal Hernia  Gastritis      Procedure in Detail:  Informed consent was obtained for the procedure, including sedation. Risks of perforation, hemorrhage, adverse drug reaction, and aspiration were discussed. The patient was placed in the left lateral decubitus position. Based on the pre-procedure assessment, including review of the patient's medical history, medications, allergies, and review of systems, she had been deemed to be an appropriate candidate for moderate sedation; she was therefore sedated with the medications listed above. The patient was monitored continuously with ECG tracing, pulse oximetry, blood pressure monitoring, and direct observations. A rectal examination was performed. The [id number] was inserted into the rectum and advanced under direct vision to the cecum, which was identified by the ileocecal valve and appendiceal orifice. The quality of the colonic preparation was good. A careful inspection was made as the colonoscope was withdrawn, including a retroflexed view of the rectum; findings and interventions are described below. Appropriate photodocumentation was obtained. Findings:  Rectum: normal  Sigmoid: mild diverticulosis; Descending Colon: mild diverticulosis;  Transverse Colon: normal  Ascending Colon: normal  Cecum: normal    Specimens:    none    EBL: None    Complications: None; patient tolerated the procedure well.     Recommendations:     - For colon cancer screening in this average-risk patient, colonoscopy may be repeated in 10 years. - High fiber diet.         Signed By: Saundra Britton MD                        June 20, 2019

## 2019-06-20 NOTE — PROCEDURES
[address]  [address]                 NAME:  Zayra Browne   :   1970   MRN:   [id number]     Date/Time:  2019 1:25 PM    Esophagogastroduodenoscopy (EGD) Procedure Note    :  Christianne Crawford MD    Referring Provider:  Patsy Jiménez MD    Anethesia/Sedation:  MAC anesthesia Propofol    Preoperative diagnosis: GASTRO-ESOPHAGEL REFLUX DISEASE  SCREENING FOR MALIGNANT NEOPLASMS OF COLON    Postoperative diagnosis: Hiatal Hernia  Gastritis      Procedure Details     After infom consent was obtained for the procedure, with all risks and benefits of procedure explained the patient was taken to the endoscopy suite and placed in the left lateral decubitus position. Following sequential administration of sedation as per above, the [id number] gastroscope was inserted into the mouth and advanced under direct vision to second portion of the duodenum. A careful inspection was made as the gastroscope was withdrawn, including a retroflexed view of the proximal stomach; findings and interventions are described below. Findings:  Esophagus:Small hiatal hernia with Z line at 36 cm. Biopsies obtained from GE junction to rule out Mccall esophagus  Stomach:Patchy antral erythema, biopsies done. Duodenum/jejunum:patchy erythema in duodenum, biopsies obtained from bulb and descending duodenum. Therapies:  none    Specimens: antral, duodenal and GE junction biopsies           EBL: None    Complications:   None; patient tolerated the procedure well. Impression:    See Postoperative diagnosis above    Recommendations:  -Acid suppression with a proton pump inhibitor. , -Await pathology. Avoid NSAIDS.  FU with Dr Melissa Tay in 2-3 months    Discharge disposition:  Home in the company of  when able to ambulate    Christianne Crawford MD

## 2019-06-21 ENCOUNTER — PATIENT OUTREACH (OUTPATIENT)
Dept: OTHER | Age: 49
End: 2019-06-21

## 2019-06-21 NOTE — PROGRESS NOTES
Verified  and address for HIPAA security. Introduced [employer] for patient. Patient does not identify any Care Management needs at this time and declines services. *Spoke with patient who reports that she is doing very well this morning. Denies any Red Flags Symptoms. Call 911 anytime you think you may need emergency care.  For example, call if:   Call your doctor now or seek immediate medical care if:  Increasing pain, nausea, vomiting  Abdominal distension (swelling)  New increased bleeding (oral or rectal)  Fever (chills)  Pain in chest area  Bloody discharge from nose or mouth  Shortness of breath

## 2020-04-07 ENCOUNTER — VIRTUAL VISIT (OUTPATIENT)
Dept: SLEEP MEDICINE | Age: 50
End: 2020-04-07

## 2020-04-07 ENCOUNTER — DOCUMENTATION ONLY (OUTPATIENT)
Dept: SLEEP MEDICINE | Age: 50
End: 2020-04-07

## 2020-04-07 VITALS
BODY MASS INDEX: 34.32 KG/M2 | DIASTOLIC BLOOD PRESSURE: 72 MMHG | SYSTOLIC BLOOD PRESSURE: 124 MMHG | HEART RATE: 74 BPM | WEIGHT: 206 LBS | HEIGHT: 65 IN

## 2020-04-07 DIAGNOSIS — I10 ESSENTIAL HYPERTENSION: ICD-10-CM

## 2020-04-07 DIAGNOSIS — G47.33 OSA (OBSTRUCTIVE SLEEP APNEA): Primary | ICD-10-CM

## 2020-04-07 RX ORDER — PHENTERMINE HYDROCHLORIDE 37.5 MG/1
TABLET ORAL
COMMUNITY

## 2020-04-07 RX ORDER — MELATONIN
COMMUNITY
Start: 2015-04-17

## 2020-04-07 RX ORDER — PYRIDOXINE HCL (VITAMIN B6) 100 MG
100 TABLET ORAL DAILY
COMMUNITY

## 2020-04-07 NOTE — PROGRESS NOTES
Bilevel pressures changed remotely to 13/9 cmH2O per Dr. Valerie Kelley orders.     Mandie Key,RRT,RPSGT, CSE

## 2020-04-07 NOTE — PROGRESS NOTES
[address] [facility]. [place], [address]  Tel.  [phone]  Fax. 100 [facility] 60  [place], [address] [facility]  Tel.  [phone]  Fax. [phone] [address]  Tel.  [phone]  Fax. [phone]       Raúl Travis is a 44 y.o. female who was seen by synchronous (real-time) audio-video technology on 4/7/2020 after verifying identity using drivers license. She reports no problems using the device. She is 100% compliant over the past 30 days. The following problems are identified:    Drowsiness no Problems exhaling no   Snoring no Forget to put on no   Mask Comfortable yes Can't fall asleep no   Dry Mouth no Mask falls off no   Air Leaking yes Frequent awakenings no       She admits that her sleep has improved on PAP therapy using nasal pillows mask and heated tubing. She reports of persistent but improved air leak and bloating but which has improved. Allergies   Allergen Reactions    Erythromycin Rash       She has a current medication list which includes the following prescription(s): pyridoxine (vitamin b6), phentermine, cholecalciferol, lactobacillus acidophilus, pantoprazole, lisinopril, simvastatin, cyanocobalamin, aspirin delayed-release, ergocalciferol (vitamin d2), nicotinic acid, and ranitidine. .      She  has a past medical history of Arthritis, GERD (gastroesophageal reflux disease), Hypertension, Ill-defined condition, Ill-defined condition, and Sleep apnea. She also has no past medical history of Adverse effect of anesthesia. [place] Sleepiness Score: 4   and Modified F.O.S.Q. Score Total / 2: 18   which reflect improved sleep quality over therapy time. O>      Visit Vitals  /72 (BP 1 Location: Left arm, BP Patient Position: Sitting)   Pulse 74   Ht 5' 5\" (1.651 m)   Wt 206 lb (93.4 kg)   BMI 34.28 kg/m²         Vital Signs, Height and Weight were provided by the patient.   Due to this being a Tele Health evaluation, many elements of the physical examination are unable to be assessed. General:   Alert, oriented, not in distress   Respiratory  Appeared to be breathing comfortably   Neuro:  Fluent Speech; No obvious facial asymmetry    Psych:  Normal affect ,  Normal countenance ;             A>    ICD-10-CM ICD-9-CM    1. DIANA (obstructive sleep apnea) G47.33 327.23    2. Essential hypertension I10 401.9    3. BMI 34.0-34.9,adult Z68.34 V85.34      AHI = 55 (2016). On Bi - Level :  Max IPAP 14; EPAP 10; PS 4 cmH2O. Compliant:      yes    Therapeutic Response:  Positive    P>    * Patient is using her PAP device regularly and benefiting form therapy,  continued use of the device at 13/9 cmH2O is advised. Orders Placed This Encounter    AMB SUPPLY ORDER     Diagnosis: (G47.33) DIANA (obstructive sleep apnea)  (primary encounter diagnosis)     Replacement Supplies for Positive Airway Pressure Therapy Device:   Duration of need: 99 months.  Nasal Pillows Combo Mask (Replace) 2 per month.  Nasal Pillows (Replace) 2 per month.  Headgear 1 every 6 months.  Tubing with heating element 1 every 3 months.  Filter(s) Disposable 2 per month.  Filter(s) Non-Disposable 1 every 6 months. .   [facility] for Humidifier (Replace) 1 every 6 months. Gem Griffin MD, FAASM; NPI: [id number]    Electronically signed. Date:- 04/07/20       * She is familiar with the telephone monitoring application, is willing to track therapy and agrees to notify use if AHI is >5 per hour consistently. * She is aware of the relationship between DIANA and HTN which is stable as is her mood (patient is currently on anti-depressant therapy). * We have recommended a dedicated weight loss through appropriate diet and an exercise regiment as significant weight reduction has been shown to reduce severity of obstructive sleep apnea.        *   Follow-up and Dispositions · Return in about 1 year (around 4/7/2021), or if symptoms worsen or fail to improve. * She was asked to contact our office for any problems regarding PAP therapy. * Counseling was provided regarding the importance of regular PAP use and on proper sleep hygiene and safe driving. * Re-enforced proper and regular cleaning for the device. Thank you for allowing us to participate in your patient's medical care. Pursuant to the emergency declaration under the [address] [place] [address], [facility] waiver authority and the [employer] Act, this Virtual  Visit was conducted, with patient's consent, to reduce the patient's risk of exposure to COVID-19 and provide continuity of care for an established patient. Services were provided through a video synchronous discussion virtually to substitute for in-person clinic visit. Kristy Mohan MD, FAASM  Electronically signed.  04/07/20

## 2020-04-07 NOTE — PATIENT INSTRUCTIONS
[address] [place]. [place], [address]  Tel.  [phone]  Fax. 100 [facility] 60  [place], [address] [facility]  Tel.  [phone]  Fax. [phone] [address] [place] [address]  Tel.  [phone]  Fax. [phone]     Learning About CPAP for Sleep Apnea  What is CPAP? CPAP is a small machine that you use at home every night while you sleep. It increases air pressure in your throat to keep your airway open. When you have sleep apnea, this can help you sleep better so you feel much better. CPAP stands for \"continuous positive airway pressure. \"  The CPAP machine will have one of the following:  · A mask that covers your nose and mouth  · Prongs that fit into your nose  · A mask that covers your nose only, the most common type. This type is called NCPAP. The N stands for \"nasal.\"  Why is it done? CPAP is usually the best treatment for obstructive sleep apnea. It is the first treatment choice and the most widely used. Your doctor may suggest CPAP if you have:  · Moderate to severe sleep apnea. · Sleep apnea and coronary artery disease (CAD) or heart failure. How does it help? · CPAP can help you have more normal sleep, so you feel less sleepy and more alert during the daytime. · CPAP may help keep heart failure or other heart problems from getting worse. · NCPAP may help lower your blood pressure. · If you use CPAP, your bed partner may also sleep better because you are not snoring or restless. What are the side effects? Some people who use CPAP have:  · A dry or stuffy nose and a sore throat. · Irritated skin on the face. · Sore eyes. · Bloating. If you have any of these problems, work with your doctor to fix them. Here are some things you can try:  · Be sure the mask or nasal prongs fit well. · See if your doctor can adjust the pressure of your CPAP. · If your nose is dry, try a humidifier.   · If your nose is runny or stuffy, try decongestant medicine or a steroid nasal spray. If these things do not help, you might try a different type of machine. Some machines have air pressure that adjusts on its own. Others have air pressures that are different when you breathe in than when you breathe out. This may reduce discomfort caused by too much pressure in your nose. Where can you learn more? Go to [place].[employer]  Enter Terry Lauren in the search box to learn more about \"Learning About CPAP for Sleep Apnea. \"   © 9626-2865 Healthwise, Incorporated. Care instructions adapted under license by [facility] (which disclaims liability or warranty for this information). This care instruction is for use with your licensed healthcare professional. If you have questions about a medical condition or this instruction, always ask your healthcare professional. [address] any warranty or liability for your use of this information. Content Version: 2.0.73316; Last Revised: January 11, 2010  PROPER SLEEP HYGIENE    What to avoid  · Do not have drinks with caffeine, such as coffee or black tea, for 8 hours before bed. · Do not smoke or use other types of tobacco near bedtime. Nicotine is a stimulant and can keep you awake. · Avoid drinking alcohol late in the evening, because it can cause you to wake in the middle of the night. · Do not eat a big meal close to bedtime. If you are hungry, eat a light snack. · Do not drink a lot of water close to bedtime, because the need to urinate may wake you up during the night. · Do not read or watch TV in bed. Use the bed only for sleeping and sexual activity. What to try  · Go to bed at the same time every night, and wake up at the same time every morning. Do not take naps during the day. · Keep your bedroom quiet, dark, and cool. · Get regular exercise, but not within 3 to 4 hours of your bedtime. .  · Sleep on a comfortable pillow and mattress.   · If watching the clock makes you anxious, turn it facing away from you so you cannot see the time. · If you worry when you lie down, start a worry book. Well before bedtime, write down your worries, and then set the book and your concerns aside. · Try meditation or other relaxation techniques before you go to bed. · If you cannot fall asleep, get up and go to another room until you feel sleepy. Do something relaxing. Repeat your bedtime routine before you go to bed again. · Make your house quiet and calm about an hour before bedtime. Turn down the lights, turn off the TV, log off the computer, and turn down the volume on music. This can help you relax after a busy day. Drowsy Driving: The [employer] cites drowsiness as a causing factor in more than 405,988 police reported crashes annually, resulting in 76,000 injuries and 1,500 deaths. Other surveys suggest 55% of people polled have driven while drowsy in the past year, 23% had fallen asleep but not crashed, 3% crashed, and 2% had and accident due to drowsy driving. Who is at risk? Young Drivers: One study of drowsy driving accidents states that 55% of the drivers were under 25 years. Of those, 75% were male. Shift Workers and Travelers: People who work overnight or travel across time zones frequently are at higher risk of experiencing Circadian Rhythm Disorders. They are trying to work and function when their body is programed to sleep. Sleep Deprived: Lack of sleep has a serious impact on your ability to pay attention or focus on a task. Consistently getting less than the average of 8 hours your body needs creates partial or cumulative sleep deprivation. Untreated Sleep Disorders: Sleep Apnea, Narcolepsy, R.L.S., and other sleep disorders (untreated) prevent a person from getting enough restful sleep. This leads to excessive daytime sleepiness and increases the risk for drowsy driving accidents by up to 7 times.   Medications / Alcohol: Even over the counter medications can cause drowsiness. Medications that impair a drivers attention should have a warning label. Alcohol naturally makes you sleepy and on its own can cause accidents. Combined with excessive drowsiness its effects are amplified. Signs of Drowsy Driving:   * You don't remember driving the last few miles   * You may drift out of your toño   * You are unable to focus and your thoughts wander   * You may yawn more often than normal   * You have difficulty keeping your eyes open / nodding off   * Missing traffic signs, speeding, or tailgating  Prevention-   Good sleep hygiene, lifestyle and behavioral choices have the most impact on drowsy driving. There is no substitute for sleep and the average person requires 8 hours nightly. If you find yourself driving drowsy, stop and sleep. Consider the sleep hygiene tips provided during your visit as well. Medication Refill Policy: Refills for all medications require 1 week advance notice. Please have your pharmacy fax a refill request. We are unable to fax, or call in \"controled substance\" medications and you will need to pick these prescriptions up from our office. [employer] Activation    Thank you for requesting access to [employer]. Please follow the instructions below to securely access and download your online medical record. [employer] allows you to send messages to your doctor, view your test results, renew your prescriptions, schedule appointments, and more. How Do I Sign Up? 1. In your internet browser, go to [email or web address]. 2. Click on the First Time User? Click Here link in the Sign In box. You will see the New Member Sign Up page. 3. Enter your [employer] Access Code exactly as it appears below. You will not need to use this code after youve completed the sign-up process. If you do not sign up before the expiration date, you must request a new code. [employer] Access Code:  Activation code not generated  Current J[employer] Status: Active (This is the date your [employer] access code will )    4. Enter the last four digits of your Social Security Number (xxxx) and Date of Birth (mm/dd/yyyy) as indicated and click Submit. You will be taken to the next sign-up page. 5. Create a Adynxxt ID. This will be your Ad[employer] login ID and cannot be changed, so think of one that is secure and easy to remember. 6. Create a [employer] password. You can change your password at any time. 7. Enter your Password Reset Question and Answer. This can be used at a later time if you forget your password. 8. Enter your e-mail address. You will receive e-mail notification when new information is available in [address]. 9. Click Sign Up. You can now view and download portions of your medical record. 10. Click the Download Summary menu link to download a portable copy of your medical information. Additional Information    If you have questions, please call [phone]. Remember, [employer] is NOT to be used for urgent needs. For medical emergencies, dial 911.

## 2021-11-01 ENCOUNTER — TRANSCRIBE ORDER (OUTPATIENT)
Dept: SCHEDULING | Age: 51
End: 2021-11-01

## 2021-11-01 DIAGNOSIS — R14.0 BLOATING: Primary | ICD-10-CM

## 2021-11-01 DIAGNOSIS — K59.00 CONSTIPATION, UNSPECIFIED: ICD-10-CM

## 2021-11-22 ENCOUNTER — HOSPITAL ENCOUNTER (OUTPATIENT)
Dept: CT IMAGING | Age: 51
Discharge: HOME | End: 2021-11-22
Attending: NURSE PRACTITIONER
Payer: COMMERCIAL

## 2021-11-22 DIAGNOSIS — K59.00 CONSTIPATION, UNSPECIFIED: ICD-10-CM

## 2021-11-22 DIAGNOSIS — R14.0 BLOATING: ICD-10-CM

## 2021-11-22 PROCEDURE — 74011000250 HC RX REV CODE- 250: Performed by: NURSE PRACTITIONER

## 2021-11-22 PROCEDURE — 74011000636 HC RX REV CODE- 636: Performed by: NURSE PRACTITIONER

## 2021-11-22 PROCEDURE — 74177 CT ABD & PELVIS W/CONTRAST: CPT

## 2021-11-22 RX ORDER — BARIUM SULFATE 20 MG/ML
900 SUSPENSION ORAL
Status: COMPLETED | OUTPATIENT
Start: 2021-11-22 | End: 2021-11-22

## 2021-11-22 RX ADMIN — IOPAMIDOL 100 ML: 755 INJECTION, SOLUTION INTRAVENOUS at 09:08

## 2021-11-22 RX ADMIN — BARIUM SULFATE 900 ML: 20 SUSPENSION ORAL at 09:08

## 2022-03-18 PROBLEM — E66.01 SEVERE OBESITY (HCC): Status: ACTIVE | Noted: 2019-02-08

## 2023-05-11 RX ORDER — SIMVASTATIN 40 MG
TABLET ORAL
COMMUNITY

## 2023-05-11 RX ORDER — LISINOPRIL 5 MG/1
TABLET ORAL DAILY
COMMUNITY

## 2023-05-11 RX ORDER — RANITIDINE 150 MG/1
TABLET ORAL 2 TIMES DAILY
COMMUNITY

## 2023-05-11 RX ORDER — NIACIN 500 MG
TABLET ORAL
COMMUNITY

## 2023-05-11 RX ORDER — ASPIRIN 81 MG/1
TABLET ORAL DAILY
COMMUNITY

## 2023-05-11 RX ORDER — PYRIDOXINE HCL (VITAMIN B6) 100 MG
100 TABLET ORAL DAILY
COMMUNITY

## 2023-05-11 RX ORDER — PHENTERMINE HYDROCHLORIDE 37.5 MG/1
TABLET ORAL
COMMUNITY

## 2023-05-11 RX ORDER — PANTOPRAZOLE SODIUM 40 MG/1
1 TABLET, DELAYED RELEASE ORAL DAILY
COMMUNITY
Start: 2018-10-07

## (undated) DEVICE — CONTAINER SPEC 20 ML LID NEUT BUFF FORMALIN 10 % POLYPR STS

## (undated) DEVICE — BAG BELONG PT PERS CLEAR HANDL

## (undated) DEVICE — 1200 GUARD II KIT W/5MM TUBE W/O VAC TUBE: Brand: GUARDIAN

## (undated) DEVICE — NEEDLE HYPO 18GA L1.5IN PNK S STL HUB POLYPR SHLD REG BVL

## (undated) DEVICE — AIRLIFE™ U/CONNECT-IT OXYGEN TUBING 7 FEET (2.1 M) CRUSH-RESISTANT OXYGEN TUBING, VINYL TIPPED: Brand: AIRLIFE™

## (undated) DEVICE — CONNECTOR TBNG AUX H2O JET DISP FOR OLY 160/180 SER

## (undated) DEVICE — SYRINGE MED 20ML STD CLR PLAS LUERLOCK TIP N CTRL DISP

## (undated) DEVICE — BAG SPEC BIOHZD LF 2MIL 6X10IN -- CONVERT TO ITEM 357326

## (undated) DEVICE — BW-412T DISP COMBO CLEANING BRUSH: Brand: SINGLE USE COMBINATION CLEANING BRUSH

## (undated) DEVICE — Device: Brand: MEDICAL ACTION INDUSTRIES

## (undated) DEVICE — CATH IV AUTOGRD BC BLU 22GA 25 -- INSYTE

## (undated) DEVICE — SOLIDIFIER FLUID 3000 CC ABSORB

## (undated) DEVICE — CANN NASAL O2 CAPNOGRAPHY AD -- FILTERLINE

## (undated) DEVICE — QUILTED PREMIUM COMFORT UNDERPAD,EXTRA HEAVY: Brand: WINGS

## (undated) DEVICE — KENDALL RADIOLUCENT FOAM MONITORING ELECTRODE -RECTANGULAR SHAPE: Brand: KENDALL

## (undated) DEVICE — SET ADMIN 16ML TBNG L100IN 2 Y INJ SITE IV PIGGY BK DISP

## (undated) DEVICE — Z DISCONTINUED NO SUB IDED SET EXTN W/ 4 W STPCOCK M SPIN LOK 36IN

## (undated) DEVICE — ENDO CARRY-ON PROCEDURE KIT INCLUDES ENZYMATIC SPONGE, GAUZE, BIOHAZARD LABEL, TRAY, LUBRICANT, DIRTY SCOPE LABEL, WATER LABEL, TRAY, DRAWSTRING PAD, AND DEFENDO 4-PIECE KIT.: Brand: ENDO CARRY-ON PROCEDURE KIT

## (undated) DEVICE — FORCEPS BX L240CM JAW DIA2.8MM L CAP W/ NDL MIC MESH TOOTH

## (undated) DEVICE — Z DISCONTINUED USE 2751540 TUBING IRRIG L10IN DISP PMP ENDOGATOR